# Patient Record
Sex: FEMALE | Race: WHITE | NOT HISPANIC OR LATINO | Employment: FULL TIME | URBAN - METROPOLITAN AREA
[De-identification: names, ages, dates, MRNs, and addresses within clinical notes are randomized per-mention and may not be internally consistent; named-entity substitution may affect disease eponyms.]

---

## 2022-09-19 PROBLEM — E28.2 PCOS (POLYCYSTIC OVARIAN SYNDROME): Status: ACTIVE | Noted: 2022-09-19

## 2022-09-20 ENCOUNTER — ANNUAL EXAM (OUTPATIENT)
Dept: OBGYN CLINIC | Facility: CLINIC | Age: 22
End: 2022-09-20

## 2022-09-20 VITALS
BODY MASS INDEX: 33.77 KG/M2 | SYSTOLIC BLOOD PRESSURE: 122 MMHG | DIASTOLIC BLOOD PRESSURE: 60 MMHG | HEIGHT: 63 IN | WEIGHT: 190.6 LBS

## 2022-09-20 DIAGNOSIS — Z11.3 ROUTINE SCREENING FOR STI (SEXUALLY TRANSMITTED INFECTION): ICD-10-CM

## 2022-09-20 DIAGNOSIS — E28.2 PCOS (POLYCYSTIC OVARIAN SYNDROME): ICD-10-CM

## 2022-09-20 DIAGNOSIS — Z01.419 ENCOUNTER FOR ANNUAL ROUTINE GYNECOLOGICAL EXAMINATION: Primary | ICD-10-CM

## 2022-09-20 DIAGNOSIS — Z30.41 SURVEILLANCE FOR BIRTH CONTROL, ORAL CONTRACEPTIVES: ICD-10-CM

## 2022-09-20 DIAGNOSIS — Z12.4 CERVICAL CANCER SCREENING: ICD-10-CM

## 2022-09-20 RX ORDER — ETHINYL ESTRADIOL/DROSPIRENONE 0.02-3(28)
1 TABLET ORAL DAILY
Qty: 84 TABLET | Refills: 4 | Status: SHIPPED | OUTPATIENT
Start: 2022-09-20

## 2022-09-20 RX ORDER — DROSPIRENONE AND ETHINYL ESTRADIOL 0.02-3(28)
1 KIT ORAL DAILY
COMMUNITY
End: 2022-09-20 | Stop reason: SDUPTHER

## 2022-09-20 NOTE — LETTER
September 20, 2022     Rossy Steiner MD  [address] [facility],[address]  [place] [address]    Patient: Ave Lester   YOB: 2000   Date of Visit: 9/20/2022       Dear Dr Marisa Thomson:    Thank you for referring Ave Lester to me for evaluation  Below are my notes for this consultation  If you have questions, please do not hesitate to call me  I look forward to following your patient along with you  Sincerely,        Evan Cuevas MD        CC: No Recipients  Evan Cuevas MD  9/20/2022 12:48 PM  Sign when Signing Visit  Annual Wellness Visit  [address] , [address]    ASSESSMENT/PLAN: Ave Lester is a 25 y o  Veda Brina who presents for annual gynecologic exam     Encounter for routine gynecologic examination  - Routine well woman exam completed today  - Cervical Cancer Screening: Current ASCCP Guidelines reviewed  Last Pap: not done due to age  Next Pap Due: today, routine   - HPV Vaccination status: Immunization series complete  - STI screening offered including HIV testing: GC/CT done, others declined  - Contraceptive counseling discussed  Current contraception: oral contraceptives (estrogen/progesterone),   - The following were reviewed in today's visit: STD testing, use and side effects of OCPs, exercise and healthy diet    Additional problems addressed during this visit:  1  Encounter for annual routine gynecological examination    2  PCOS (polycystic ovarian syndrome)    3  Cervical cancer screening  -     IGP, CtNg, rfx Aptima HPV ASCU    4  Routine screening for STI (sexually transmitted infection)  -     IGP, CtNg, rfx Aptima HPV ASCU    5  Surveillance for birth control, oral contraceptives  A/P:   Pt just restarted a couple months ago and feels mood symptoms improving  Reassured her decrease in hair growth can take longer  Wishes to continue for now  Sent 1 year for pharmacy    No contraindication to estrogen/progesterone birth control use identified  Reviewed that only condoms protect against STIs  Refilled for 1 year  -     drospirenone-ethinyl estradiol (Henna) 3-0 02 MG per tablet; Take 1 tablet by mouth daily        Next visit: 1 year Wellness      CC:  Annual Gynecologic Examination    HPI: Ave Lester is a 25 y o  Veda Brina who presents for annual gynecologic examination  She denies any breast, urinary or pelvic issues at today's visit  Questions about OCPs - On Loryna  x 3 months, through [facility]  She previously had been on Gianvi for years but stopped in 2019 because wasn't sexually active  States she was very depressed for the first 2 months and irritable  Also hair growth on chin  Crying for no reason  She states felt did better on Gianvi in past   Discussed with patient Olaf Gould and Steve Alas are both the same pills, just a different  - they have the same active ingredients  Feels sx of depression improving in last few weeks and will continue current OCP as thinks her body is adjusting  Discussed since has been off for a couple years - improvement in hair growth often takes 6 months or more of the pills to work - so hopefully will improve in next few months  Feels chin hair growth worse last 3 months    Regular periods on OCP since restarted  + sexually active  Gyn History  Patient's last menstrual period was 2022 (approximate)  Last Pap: not done due to age    She  reports being sexually active and has had partner(s) who are male  She reports using the following method of birth control/protection: OCP         OB History      Past Medical History:  No date: Depression      Comment:  no h/o medication in past; did see therapist but not                currently  No date: Polycystic ovary syndrome     Past Surgical History:  2017: APPENDECTOMY     Family History   Problem Relation Age of Onset    Uterine cancer Other         diagnosed in her [de-identified]    Breast cancer Neg Hx     Ovarian cancer Neg Hx     Colon cancer Neg Hx         Social History     Tobacco Use    Smoking status: Current Every Day Smoker     Years: 2 00     Types: Pipe    Smokeless tobacco: Never Used   Vaping Use    Vaping Use: Never used   Substance Use Topics    Alcohol use: Yes     Alcohol/week: 1 0 standard drink     Types: 1 Shots of liquor per week     Comment: I dont drink often enough to have a weekly estimate    Drug use: Never          Current Outpatient Medications:     drospirenone-ethinyl estradiol (Henna) 3-0 02 MG per tablet, Take 1 tablet by mouth daily, Disp: 84 tablet, Rfl: 4    She has No Known Allergies       ROS negative except as noted in HPI    Objective:  /60   Ht 5' 3" (1 6 m)   Wt 86 5 kg (190 lb 9 6 oz)   LMP 09/11/2022 (Approximate)   Breastfeeding No   BMI 33 76 kg/m²      Physical Exam  Constitutional:       Appearance: Normal appearance  Chest:   Breasts:      Right: Normal  No mass, tenderness or axillary adenopathy  Left: Normal  No mass, tenderness or axillary adenopathy  Abdominal:      Palpations: Abdomen is soft  Tenderness: There is no abdominal tenderness  Genitourinary:     General: Normal vulva  Vagina: No bleeding or lesions  Cervix: Normal       Uterus: Normal  Not tender  Adnexa:         Right: No mass or tenderness  Left: No mass or tenderness  Rectum: No external hemorrhoid  Musculoskeletal:         General: Normal range of motion  Lymphadenopathy:      Upper Body:      Right upper body: No axillary adenopathy  Left upper body: No axillary adenopathy  Neurological:      Mental Status: She is alert and oriented to person, place, and time     Psychiatric:         Mood and Affect: Mood normal          Behavior: Behavior normal

## 2022-09-20 NOTE — PROGRESS NOTES
Annual Wellness Visit  [address], [address], [facility] [address] [facility]    ASSESSMENT/PLAN: Toya Mchugh is a 25 y o  Julissa Conn who presents for annual gynecologic exam     Encounter for routine gynecologic examination  - Routine well woman exam completed today  - Cervical Cancer Screening: Current ASCCP Guidelines reviewed  Last Pap: not done due to age  Next Pap Due: today, routine   - HPV Vaccination status: Immunization series complete  - STI screening offered including HIV testing: GC/CT done, others declined  - Contraceptive counseling discussed  Current contraception: oral contraceptives (estrogen/progesterone),   - The following were reviewed in today's visit: STD testing, use and side effects of OCPs, exercise and healthy diet    Additional problems addressed during this visit:  1  Encounter for annual routine gynecological examination    2  PCOS (polycystic ovarian syndrome)    3  Cervical cancer screening  -     IGP, CtNg, rfx Aptima HPV ASCU    4  Routine screening for STI (sexually transmitted infection)  -     IGP, CtNg, rfx Aptima HPV ASCU    5  Surveillance for birth control, oral contraceptives  A/P:   Pt just restarted a couple months ago and feels mood symptoms improving  Reassured her decrease in hair growth can take longer  Wishes to continue for now  Sent 1 year for pharmacy  No contraindication to estrogen/progesterone birth control use identified  Reviewed that only condoms protect against STIs  Refilled for 1 year  -     drospirenone-ethinyl estradiol (Henna) 3-0 02 MG per tablet; Take 1 tablet by mouth daily        Next visit: 1 year Wellness      CC:  Annual Gynecologic Examination    HPI: Toya Mchugh is a 25 y o  Julissa Conn who presents for annual gynecologic examination  She denies any breast, urinary or pelvic issues at today's visit  Questions about OCPs - On Loryna  x 3 months, through [facility]    She previously had been on Gianvi for years but stopped in 2019 because wasn't sexually active  States she was very depressed for the first 2 months and irritable  Also hair growth on chin  Crying for no reason  She states felt did better on Gianvi in past   Discussed with patient Minal Hurst and Victoriano Knapp are both the same pills, just a different  - they have the same active ingredients  Feels sx of depression improving in last few weeks and will continue current OCP as thinks her body is adjusting  Discussed since has been off for a couple years - improvement in hair growth often takes 6 months or more of the pills to work - so hopefully will improve in next few months  Feels chin hair growth worse last 3 months    Regular periods on OCP since restarted  + sexually active  Gyn History  Patient's last menstrual period was 2022 (approximate)  Last Pap: not done due to age    She  reports being sexually active and has had partner(s) who are male  She reports using the following method of birth control/protection: OCP  OB History      Past Medical History:  No date: Depression      Comment:  no h/o medication in past; did see therapist but not                currently  No date: Polycystic ovary syndrome     Past Surgical History:  2017: APPENDECTOMY     Family History   Problem Relation Age of Onset    Uterine cancer Other         diagnosed in her [de-identified]    Breast cancer Neg Hx     Ovarian cancer Neg Hx     Colon cancer Neg Hx         Social History     Tobacco Use    Smoking status: Current Every Day Smoker     Years: 2 00     Types: Pipe    Smokeless tobacco: Never Used   Vaping Use    Vaping Use: Never used   Substance Use Topics    Alcohol use:  Yes     Alcohol/week: 1 0 standard drink     Types: 1 Shots of liquor per week     Comment: I dont drink often enough to have a weekly estimate    Drug use: Never          Current Outpatient Medications:     drospirenone-ethinyl estradiol (Henna) 3-0 02 MG per tablet, Take 1 tablet by mouth daily, Disp: 84 tablet, Rfl: 4    She has No Known Allergies       ROS negative except as noted in HPI    Objective:  /60   Ht 5' 3" (1 6 m)   Wt 86 5 kg (190 lb 9 6 oz)   LMP 09/11/2022 (Approximate)   Breastfeeding No   BMI 33 76 kg/m²      Physical Exam  Constitutional:       Appearance: Normal appearance  Chest:   Breasts:      Right: Normal  No mass, tenderness or axillary adenopathy  Left: Normal  No mass, tenderness or axillary adenopathy  Abdominal:      Palpations: Abdomen is soft  Tenderness: There is no abdominal tenderness  Genitourinary:     General: Normal vulva  Vagina: No bleeding or lesions  Cervix: Normal       Uterus: Normal  Not tender  Adnexa:         Right: No mass or tenderness  Left: No mass or tenderness  Rectum: No external hemorrhoid  Musculoskeletal:         General: Normal range of motion  Lymphadenopathy:      Upper Body:      Right upper body: No axillary adenopathy  Left upper body: No axillary adenopathy  Neurological:      Mental Status: She is alert and oriented to person, place, and time     Psychiatric:         Mood and Affect: Mood normal          Behavior: Behavior normal

## 2022-09-20 NOTE — LETTER
2022     Doc MD Gabriel  [address] [facility],[address]  [place] [address]    Patient: Elly Romero   YOB: 2000   Date of Visit: 2022       Dear Dr Karl Vallejo:    Thank you for referring Elly Romero to me for evaluation  Below are my notes for this consultation  If you have questions, please do not hesitate to call me  I look forward to following your patient along with you  Sincerely,        Charlie Lopez MD        CC: No Recipients  Charlie Lopez MD  2022 12:11 PM  Incomplete  Annual Wellness Visit  [address], [address], [address]    ASSESSMENT/PLAN: Elly Romero is a 25 y o  Galen Grise who presents for annual gynecologic exam     Encounter for routine gynecologic examination  - Routine well woman exam completed today  - Cervical Cancer Screening: Current ASCCP Guidelines reviewed  Last Pap: not done due to age  Next Pap Due: today, routine   - HPV Vaccination status: {hpvimm:70215::"Immunization series complete"}  - STI screening offered including HIV testing: {stitestin}  - Contraceptive counseling discussed  Current contraception: {Contraception:01765::"***"}  - The following were reviewed in today's visit: {Gyn counselin}    Additional problems addressed during this visit:  1  Encounter for annual routine gynecological examination    2  PCOS (polycystic ovarian syndrome)        Next visit: ***      CC:  Annual Gynecologic Examination    HPI: Elly Romero is a 25 y o  Hillman Grise who presents for annual gynecologic examination  Wellness  Discuss changing OC's  On Loryna  x 3 months,through [facility]  Pt  States she was very depressed for the first 2 months and irritable  Also hair growth on chin  Crying for no reason  Previously on [place] about 3 years ago  Also irritable while taking that OC  It did help with crying and hair growth      Discussed with patient Dotty Forget and [place] are both the same pills, just a different  - they have the same active ingredients  Gyn History  Patient's last menstrual period was 2022 (approximate)  Last Pap: not done due to age    She  reports being sexually active and has had partner(s) who are male  She reports using the following method of birth control/protection: OCP  OB History      Past Medical History:  No date: Depression  No date: Polycystic ovary syndrome     Past Surgical History:  2017: APPENDECTOMY     No family history on file  Social History     Tobacco Use    Smoking status: Current Every Day Smoker     Years: 2 00     Types: Pipe    Smokeless tobacco: Never Used   Substance Use Topics    Alcohol use: Yes     Alcohol/week: 1 0 standard drink     Types: 1 Shots of liquor per week     Comment: I dont drink often enough to have a weekly estimate    Drug use: Never        No current outpatient medications on file  She has No Known Allergies       ROS negative except as noted in HPI    Objective:  /60   Ht 5' 3" (1 6 m)   Wt 86 5 kg (190 lb 9 6 oz)   LMP 2022 (Approximate)   Breastfeeding No   BMI 33 76 kg/m²      Physical Exam

## 2022-09-24 LAB
C TRACH RRNA CVX QL NAA+PROBE: NEGATIVE
CYTOLOGIST CVX/VAG CYTO: NORMAL
DX ICD CODE: NORMAL
N GONORRHOEA RRNA CVX QL NAA+PROBE: NEGATIVE
OTHER STN SPEC: NORMAL
OTHER STN SPEC: NORMAL
PATH REPORT.FINAL DX SPEC: NORMAL
SL AMB NOTE:: NORMAL
SL AMB SPECIMEN ADEQUACY: NORMAL
SL AMB TEST METHODOLOGY: NORMAL

## 2025-08-19 ENCOUNTER — APPOINTMENT (OUTPATIENT)
Dept: URBAN - METROPOLITAN AREA CLINIC 26 | Facility: CLINIC | Age: 25
Setting detail: DERMATOLOGY
End: 2025-08-19

## 2025-08-19 DIAGNOSIS — D22 MELANOCYTIC NEVI: ICD-10-CM

## 2025-08-19 PROCEDURE — ? COUNSELING

## 2025-08-19 PROCEDURE — ? PHOTO-DOCUMENTATION

## 2025-08-19 ASSESSMENT — LOCATION ZONE DERM: LOCATION ZONE: ARM

## 2025-08-19 ASSESSMENT — LOCATION DETAILED DESCRIPTION DERM: LOCATION DETAILED: RIGHT ANTERIOR SHOULDER

## 2025-08-19 ASSESSMENT — LOCATION SIMPLE DESCRIPTION DERM: LOCATION SIMPLE: RIGHT SHOULDER
